# Patient Record
Sex: MALE | Race: WHITE | NOT HISPANIC OR LATINO | Employment: OTHER | URBAN - METROPOLITAN AREA
[De-identification: names, ages, dates, MRNs, and addresses within clinical notes are randomized per-mention and may not be internally consistent; named-entity substitution may affect disease eponyms.]

---

## 2019-06-19 ENCOUNTER — TRANSCRIBE ORDERS (OUTPATIENT)
Dept: ADMINISTRATIVE | Facility: HOSPITAL | Age: 44
End: 2019-06-19

## 2019-06-19 DIAGNOSIS — R59.0 INGUINAL LYMPHADENOPATHY: Primary | ICD-10-CM

## 2019-06-20 ENCOUNTER — HOSPITAL ENCOUNTER (OUTPATIENT)
Dept: GENERAL RADIOLOGY | Facility: HOSPITAL | Age: 44
Discharge: HOME | End: 2019-06-20
Admitting: INTERNAL MEDICINE

## 2019-06-20 ENCOUNTER — TRANSCRIBE ORDERS (OUTPATIENT)
Dept: ADMINISTRATIVE | Facility: HOSPITAL | Age: 44
End: 2019-06-20

## 2019-06-20 DIAGNOSIS — R61 GENERALIZED HYPERHIDROSIS: Primary | ICD-10-CM

## 2019-06-20 DIAGNOSIS — R59.0 INGUINAL ADENOPATHY: ICD-10-CM

## 2019-06-20 DIAGNOSIS — R61 GENERALIZED HYPERHIDROSIS: ICD-10-CM

## 2019-06-20 PROCEDURE — 71046 X-RAY EXAM CHEST 2 VIEWS: CPT

## 2019-06-27 ENCOUNTER — HOSPITAL ENCOUNTER (OUTPATIENT)
Dept: ULTRASOUND IMAGING | Facility: HOSPITAL | Age: 44
Discharge: HOME | End: 2019-06-27
Admitting: INTERNAL MEDICINE

## 2019-06-27 DIAGNOSIS — R59.0 INGUINAL LYMPHADENOPATHY: ICD-10-CM

## 2019-06-27 PROCEDURE — 76882 US LMTD JT/FCL EVL NVASC XTR: CPT

## 2020-02-25 ENCOUNTER — TRANSCRIBE ORDERS (OUTPATIENT)
Dept: ADMINISTRATIVE | Facility: HOSPITAL | Age: 45
End: 2020-02-25

## 2020-02-25 DIAGNOSIS — H53.2 DIPLOPIA: Primary | ICD-10-CM

## 2020-03-04 ENCOUNTER — HOSPITAL ENCOUNTER (OUTPATIENT)
Dept: MRI IMAGING | Facility: HOSPITAL | Age: 45
Discharge: HOME | End: 2020-03-04
Admitting: INTERNAL MEDICINE

## 2020-03-04 ENCOUNTER — APPOINTMENT (OUTPATIENT)
Dept: MRI IMAGING | Facility: HOSPITAL | Age: 45
End: 2020-03-04

## 2020-03-04 DIAGNOSIS — H53.2 DIPLOPIA: ICD-10-CM

## 2020-03-04 PROCEDURE — 70553 MRI BRAIN STEM W/O & W/DYE: CPT

## 2020-03-04 PROCEDURE — 0 GADOBENATE DIMEGLUMINE 529 MG/ML SOLUTION: Performed by: INTERNAL MEDICINE

## 2020-03-04 PROCEDURE — A9577 INJ MULTIHANCE: HCPCS | Performed by: INTERNAL MEDICINE

## 2020-03-04 RX ADMIN — GADOBENATE DIMEGLUMINE 12 ML: 529 INJECTION, SOLUTION INTRAVENOUS at 09:14

## 2020-03-12 ENCOUNTER — TRANSCRIBE ORDERS (OUTPATIENT)
Dept: ADMINISTRATIVE | Facility: HOSPITAL | Age: 45
End: 2020-03-12

## 2020-03-12 ENCOUNTER — HOSPITAL ENCOUNTER (OUTPATIENT)
Dept: GENERAL RADIOLOGY | Facility: HOSPITAL | Age: 45
Discharge: HOME | End: 2020-03-12
Admitting: INTERNAL MEDICINE

## 2020-03-12 DIAGNOSIS — R06.09 DYSPNEA ON EXERTION: Primary | ICD-10-CM

## 2020-03-12 DIAGNOSIS — R06.09 DYSPNEA ON EXERTION: ICD-10-CM

## 2020-03-12 PROCEDURE — 71046 X-RAY EXAM CHEST 2 VIEWS: CPT

## 2020-03-19 ENCOUNTER — TRANSCRIBE ORDERS (OUTPATIENT)
Dept: ADMINISTRATIVE | Facility: HOSPITAL | Age: 45
End: 2020-03-19

## 2020-03-19 DIAGNOSIS — R59.1 LYMPHADENOPATHY: ICD-10-CM

## 2020-03-19 DIAGNOSIS — R05.9 COUGH: Primary | ICD-10-CM

## 2020-04-01 ENCOUNTER — LAB (OUTPATIENT)
Dept: LAB | Facility: HOSPITAL | Age: 45
End: 2020-04-01

## 2020-04-01 ENCOUNTER — TRANSCRIBE ORDERS (OUTPATIENT)
Dept: ADMINISTRATIVE | Facility: HOSPITAL | Age: 45
End: 2020-04-01

## 2020-04-01 ENCOUNTER — HOSPITAL ENCOUNTER (OUTPATIENT)
Dept: CT IMAGING | Facility: HOSPITAL | Age: 45
Discharge: HOME | End: 2020-04-01
Admitting: PSYCHIATRY & NEUROLOGY

## 2020-04-01 DIAGNOSIS — R05.9 COUGH: ICD-10-CM

## 2020-04-01 DIAGNOSIS — R90.89 ABNORMAL COMPUTERIZED TOMOGRAPHY OF BRAIN: ICD-10-CM

## 2020-04-01 DIAGNOSIS — R59.1 LYMPHADENOPATHY: ICD-10-CM

## 2020-04-01 DIAGNOSIS — H53.2 DIPLOPIA: ICD-10-CM

## 2020-04-01 DIAGNOSIS — H53.2 DIPLOPIA: Primary | ICD-10-CM

## 2020-04-01 LAB
25(OH)D3 SERPL-MCNC: 21.5 NG/ML (ref 30–100)
FOLATE SERPL-MCNC: 10.4 NG/ML (ref 4.78–24.2)
RPR SER QL: NORMAL
T4 FREE SERPL-MCNC: 1.23 NG/DL (ref 0.93–1.7)
TSH SERPL DL<=0.05 MIU/L-ACNC: 1.83 UIU/ML (ref 0.27–4.2)
VIT B12 BLD-MCNC: 679 PG/ML (ref 211–946)

## 2020-04-01 PROCEDURE — 84439 ASSAY OF FREE THYROXINE: CPT

## 2020-04-01 PROCEDURE — 86592 SYPHILIS TEST NON-TREP QUAL: CPT

## 2020-04-01 PROCEDURE — 82746 ASSAY OF FOLIC ACID SERUM: CPT

## 2020-04-01 PROCEDURE — 86618 LYME DISEASE ANTIBODY: CPT

## 2020-04-01 PROCEDURE — 84443 ASSAY THYROID STIM HORMONE: CPT

## 2020-04-01 PROCEDURE — 86617 LYME DISEASE ANTIBODY: CPT

## 2020-04-01 PROCEDURE — 86038 ANTINUCLEAR ANTIBODIES: CPT

## 2020-04-01 PROCEDURE — 71260 CT THORAX DX C+: CPT

## 2020-04-01 PROCEDURE — 86376 MICROSOMAL ANTIBODY EACH: CPT

## 2020-04-01 PROCEDURE — 0 IOPAMIDOL PER 1 ML: Performed by: PSYCHIATRY & NEUROLOGY

## 2020-04-01 PROCEDURE — 82306 VITAMIN D 25 HYDROXY: CPT

## 2020-04-01 PROCEDURE — 84207 ASSAY OF VITAMIN B-6: CPT

## 2020-04-01 PROCEDURE — 36415 COLL VENOUS BLD VENIPUNCTURE: CPT

## 2020-04-01 PROCEDURE — 82607 VITAMIN B-12: CPT

## 2020-04-01 PROCEDURE — 86800 THYROGLOBULIN ANTIBODY: CPT

## 2020-04-01 RX ADMIN — IOPAMIDOL 100 ML: 755 INJECTION, SOLUTION INTRAVENOUS at 13:01

## 2020-04-02 LAB
ANA SER QL: NEGATIVE
THYROGLOB AB SERPL-ACNC: <1 IU/ML (ref 0–0.9)
THYROPEROXIDASE AB SERPL-ACNC: <9 IU/ML (ref 0–34)

## 2020-04-03 LAB
B BURGDOR IGG PATRN SER IB-IMP: NEGATIVE
B BURGDOR IGG+IGM SER-ACNC: 0.91 ISR (ref 0–0.9)
B BURGDOR IGM PATRN SER IB-IMP: NEGATIVE
B BURGDOR18KD IGG SER QL IB: ABNORMAL
B BURGDOR23KD IGG SER QL IB: ABNORMAL
B BURGDOR23KD IGM SER QL IB: PRESENT
B BURGDOR28KD IGG SER QL IB: ABNORMAL
B BURGDOR30KD IGG SER QL IB: ABNORMAL
B BURGDOR39KD IGG SER QL IB: PRESENT
B BURGDOR39KD IGM SER QL IB: ABNORMAL
B BURGDOR41KD IGG SER QL IB: ABNORMAL
B BURGDOR41KD IGM SER QL IB: ABNORMAL
B BURGDOR45KD IGG SER QL IB: ABNORMAL
B BURGDOR58KD IGG SER QL IB: PRESENT
B BURGDOR66KD IGG SER QL IB: ABNORMAL
B BURGDOR93KD IGG SER QL IB: PRESENT
VIT B6 SERPL-MCNC: 31.2 UG/L (ref 5.3–46.7)

## 2023-02-15 ENCOUNTER — TRANSCRIBE ORDERS (OUTPATIENT)
Dept: ADMINISTRATIVE | Facility: HOSPITAL | Age: 48
End: 2023-02-15
Payer: COMMERCIAL

## 2023-02-15 DIAGNOSIS — R29.90 UNSPECIFIED SYMPTOMS AND SIGNS INVOLVING THE NERVOUS SYSTEM: ICD-10-CM

## 2023-02-15 DIAGNOSIS — R10.9 ABDOMINAL PAIN, UNSPECIFIED ABDOMINAL LOCATION: Primary | ICD-10-CM

## 2023-02-21 ENCOUNTER — OFFICE VISIT (OUTPATIENT)
Dept: NEUROLOGY | Facility: CLINIC | Age: 48
End: 2023-02-21
Payer: COMMERCIAL

## 2023-02-21 VITALS
OXYGEN SATURATION: 96 % | DIASTOLIC BLOOD PRESSURE: 78 MMHG | HEIGHT: 70 IN | BODY MASS INDEX: 19.9 KG/M2 | HEART RATE: 69 BPM | SYSTOLIC BLOOD PRESSURE: 104 MMHG | WEIGHT: 139 LBS

## 2023-02-21 DIAGNOSIS — G37.9 DEMYELINATING CHANGES IN BRAIN: Primary | ICD-10-CM

## 2023-02-21 PROCEDURE — 99205 OFFICE O/P NEW HI 60 MIN: CPT | Performed by: PSYCHIATRY & NEUROLOGY

## 2023-02-21 RX ORDER — DIAZEPAM 5 MG/1
5 TABLET ORAL 2 TIMES DAILY PRN
Qty: 2 TABLET | Refills: 0 | Status: SHIPPED | OUTPATIENT
Start: 2023-02-21 | End: 2023-03-21

## 2023-02-21 RX ORDER — ALBUTEROL SULFATE 90 UG/1
AEROSOL, METERED RESPIRATORY (INHALATION)
COMMUNITY

## 2023-02-21 NOTE — PROGRESS NOTES
Notes by MA:  Patient has been referred to our office for neurological deficits. Pt c/o double/tunnel vision and dropping things. Pt sts he was told before by [facility] he has MS.          Subjective:     Dear Dr. Boateng, thank you for referring Mr. Castro for neurological consultation.  As you know, he is a 47-year-old gentleman sent for evaluation and management of possible demyelinating disease based on previous MRI scans.  Last MRI was performed at [facility] because of double vision and revealed 2 small stable areas of increased T2 signal in the right frontal and left mid periventricular white matter.  There was no new white matter lesions as compared to his previous study from 6 months prior.  There was no pathologic contrast-enhancement.  His diplopia has been intermittent since that time.  He is also complaining of gradually worsening fatigue over time despite how much she sleeps.  Sleeping has been an issue for him however.  He has begun to drop things.  He has decreased concentration and feels depressed.  He feels like he has balance problems and occasional jerking or spasms of the arms and legs.  He also describes numbness tingling and burning sensation sometimes extending through the shoulders and all the way down the lower extremities.  These come and go and appear to be position dependent.  No clear Lhermitte phenomenon however.  Patient ID: Cyrus Castro is a 47 y.o. male.    History of Present Illness  The following portions of the patient's history were reviewed and updated as appropriate: allergies, current medications, past family history, past medical history, past social history, past surgical history and problem list.    Review of Systems   Constitutional: Positive for fatigue. Negative for activity change and appetite change.   HENT: Negative for facial swelling and trouble swallowing.    Eyes: Positive for visual disturbance (double/tunnel vision). Negative for  photophobia and pain.   Respiratory: Negative for chest tightness, shortness of breath and wheezing.    Cardiovascular: Negative for chest pain, palpitations and leg swelling.   Gastrointestinal: Positive for abdominal pain. Negative for nausea and vomiting.   Musculoskeletal: Positive for arthralgias, back pain, gait problem, myalgias, neck pain and neck stiffness. Negative for joint swelling.   Neurological: Positive for weakness (general, BUE, BLE) and light-headedness (off balance). Negative for dizziness, tremors, seizures, syncope, facial asymmetry, speech difficulty, numbness and headaches.   Hematological: Does not bruise/bleed easily.   Psychiatric/Behavioral: Positive for agitation, decreased concentration, dysphoric mood and sleep disturbance (falling and staying asleep). Negative for behavioral problems, confusion, hallucinations, self-injury and suicidal ideas. The patient is nervous/anxious. The patient is not hyperactive.         Objective:    Neurologic Exam  Awake and alert but anxious.  Speech is fluent.  Speech comprehension is reasonable.  Reasonable fund of knowledge and normal social demeanor.    Cranial nerves II through XII normal and symmetric.      Motor exam reveals reasonable power bilaterally throughout.  I think there is subtly increased tone in the left arm as compared to the right.    Sensory exam reveals intact sensation to light touch temperature and vibration bilaterally.    Coordination testing reveals very mild dysmetria with finger-nose-finger and heel-to-shin on the left.  Normal on the right.  Romberg testing is negative.  Walks on a narrow base with good stride length and a normal arm swing.    Tendon reflexes 2+ and symmetric throughout including ankle jerks bilaterally.  Plantar signs are equivocal bilaterally.  Physical Exam  Smells of cigarette smoke.  Neck is supple.  No cervical bruits.  Cardiac rhythm is regular.  Abdomen is thin and soft.  Extremities are without  edema.  Assessment/Plan:     Diagnoses and all orders for this visit:    1. Demyelinating changes in brain (HCC) (Primary)  -     MRI Brain With & Without Contrast; Future  -     MRI Cervical Spine With & Without Contrast; Future  -     diazePAM (VALIUM) 5 MG tablet; Take 1 tablet by mouth 2 (Two) Times a Day As Needed for Anxiety (Take 1 pill 30 minutes before MRI.  Repeat at the time of MRI if necessary.).  Dispense: 2 tablet; Refill: 0     Mr. Castro is a 47-year-old gentleman with a history of intermittent diplopia as well as a variety of other complaints as outlined in detail above, who was initially evaluated at [facility] with an MRI of the brain.  This study revealed several lesions suspicious for demyelinating disease and was evaluated by Dr. Reed.  CSF analysis did reveal a mildly elevated protein but no oligoclonal bands or elevated IgG index.  CSF Lyme antibody was negative.  This left the differential somewhat open including demyelinating, autoimmune, and other inflammatory conditions of the brain.  Serial follow-up MRIs were recommended but he was lost to follow-up.  I am reinitiating work-up with MRI of the brain and cervical spine with and without contrast.  I will review the results of these when available and take any further measures that may be necessary at that point.  We will schedule follow-up here shortly after his studies.  I discussed all the above in detail with the patient and answered his questions to the best of my ability.  Thank you so much for the opportunity to participate in his care.    60 minutes patient care time today.

## 2023-03-09 ENCOUNTER — APPOINTMENT (OUTPATIENT)
Dept: MRI IMAGING | Facility: HOSPITAL | Age: 48
End: 2023-03-09
Payer: COMMERCIAL

## 2023-03-09 ENCOUNTER — HOSPITAL ENCOUNTER (OUTPATIENT)
Dept: CT IMAGING | Facility: HOSPITAL | Age: 48
Discharge: HOME | End: 2023-03-09
Admitting: INTERNAL MEDICINE
Payer: COMMERCIAL

## 2023-03-09 DIAGNOSIS — R10.9 ABDOMINAL PAIN, UNSPECIFIED ABDOMINAL LOCATION: ICD-10-CM

## 2023-03-09 PROCEDURE — 74177 CT ABD & PELVIS W/CONTRAST: CPT

## 2023-03-09 PROCEDURE — 25510000001 IOPAMIDOL 61 % SOLUTION: Performed by: INTERNAL MEDICINE

## 2023-03-09 RX ADMIN — IOPAMIDOL 100 ML: 612 INJECTION, SOLUTION INTRAVENOUS at 15:01

## 2023-03-10 ENCOUNTER — HOSPITAL ENCOUNTER (OUTPATIENT)
Dept: MRI IMAGING | Facility: HOSPITAL | Age: 48
Discharge: HOME | End: 2023-03-10
Payer: COMMERCIAL

## 2023-03-10 DIAGNOSIS — G37.9 DEMYELINATING CHANGES IN BRAIN: ICD-10-CM

## 2023-03-10 PROCEDURE — 70553 MRI BRAIN STEM W/O & W/DYE: CPT

## 2023-03-10 PROCEDURE — A9577 INJ MULTIHANCE: HCPCS | Performed by: PSYCHIATRY & NEUROLOGY

## 2023-03-10 PROCEDURE — 0 GADOBENATE DIMEGLUMINE 529 MG/ML SOLUTION: Performed by: PSYCHIATRY & NEUROLOGY

## 2023-03-10 RX ADMIN — GADOBENATE DIMEGLUMINE 13 ML: 529 INJECTION, SOLUTION INTRAVENOUS at 11:16

## 2023-03-21 ENCOUNTER — OFFICE VISIT (OUTPATIENT)
Dept: NEUROLOGY | Facility: CLINIC | Age: 48
End: 2023-03-21
Payer: COMMERCIAL

## 2023-03-21 VITALS
BODY MASS INDEX: 19.9 KG/M2 | HEART RATE: 67 BPM | SYSTOLIC BLOOD PRESSURE: 112 MMHG | OXYGEN SATURATION: 97 % | WEIGHT: 139 LBS | DIASTOLIC BLOOD PRESSURE: 80 MMHG | HEIGHT: 70 IN

## 2023-03-21 DIAGNOSIS — G37.9 DEMYELINATING CHANGES IN BRAIN: Primary | ICD-10-CM

## 2023-03-21 PROCEDURE — 1160F RVW MEDS BY RX/DR IN RCRD: CPT | Performed by: PSYCHIATRY & NEUROLOGY

## 2023-03-21 PROCEDURE — 1159F MED LIST DOCD IN RCRD: CPT | Performed by: PSYCHIATRY & NEUROLOGY

## 2023-03-21 PROCEDURE — 99214 OFFICE O/P EST MOD 30 MIN: CPT | Performed by: PSYCHIATRY & NEUROLOGY

## 2023-03-21 RX ORDER — FLUOXETINE HYDROCHLORIDE 20 MG/1
1 CAPSULE ORAL DAILY
COMMUNITY
Start: 2023-03-01

## 2023-03-21 RX ORDER — TRAMADOL HYDROCHLORIDE 50 MG/1
TABLET ORAL EVERY 6 HOURS
COMMUNITY

## 2023-03-21 RX ORDER — HYOSCYAMINE SULFATE 0.125 MG
1 TABLET ORAL
COMMUNITY
Start: 2023-03-01

## 2023-03-21 RX ORDER — DIAZEPAM 5 MG/1
5 TABLET ORAL 2 TIMES DAILY PRN
Qty: 2 TABLET | Refills: 0 | Status: SHIPPED | OUTPATIENT
Start: 2023-03-21

## 2023-03-21 NOTE — PROGRESS NOTES
Notes by MA:  Patient presents today for symptoms of demyelinating disease. Patient reports no new or worsening symptoms. Patient had MRI Brain, but was unable to complete MRI C-Spine due to pain from laying  in the machine too long.        Subjective:     Patient ID: Cyrus Castro is a 47 y.o. male.    History of Present Illness  The following portions of the patient's history were reviewed and updated as appropriate: allergies, current medications, past family history, past medical history, past social history, past surgical history and problem list.    Review of Systems   Constitutional: Positive for fatigue.   Eyes: Negative for visual disturbance.   Musculoskeletal: Positive for arthralgias, gait problem and myalgias.   Neurological: Positive for tremors and weakness (general). Negative for dizziness, seizures, syncope, facial asymmetry, speech difficulty, light-headedness, numbness and headaches.   Psychiatric/Behavioral: Positive for sleep disturbance. Negative for agitation, behavioral problems, confusion, decreased concentration, dysphoric mood, hallucinations, self-injury and suicidal ideas. The patient is not nervous/anxious and is not hyperactive.         Objective:    Neurologic Exam  Awake and alert but anxious.  Speech is fluent.  Speech comprehension is reasonable.  Reasonable fund of knowledge and normal social demeanor.     Cranial nerves II through XII normal and symmetric.       Motor exam reveals reasonable power bilaterally throughout.  I think there is subtly increased tone in the left arm as compared to the right.     Sensory exam reveals intact sensation to light touch temperature and vibration bilaterally.     Coordination testing reveals very mild dysmetria with finger-nose-finger and heel-to-shin on the left.  Normal on the right.  Romberg testing is negative.  Walks on a narrow base with good stride length and a normal arm swing.     Tendon reflexes 2+ and symmetric throughout  including ankle jerks bilaterally.  Plantar signs are equivocal bilaterally.  Physical Exam    Assessment/Plan:     Diagnoses and all orders for this visit:    1. Demyelinating changes in brain (HCC) (Primary)  -     MRI Cervical Spine With & Without Contrast; Future  -     diazePAM (VALIUM) 5 MG tablet; Take 1 tablet by mouth 2 (Two) Times a Day As Needed for Anxiety. Take one 20 min prior to MRI, repeat at time of MRI if needed  Dispense: 2 tablet; Refill: 0     He completed his brain MRI with and without contrast.  This study did reveal enlargement of some of the white matter lesions but no clear contrast-enhancement to suggest acute demyelinating attack.  He could not tolerate his C-spine MRI and we will arrange that for him once again.  Again, he will need sedation.  I will review the results and will make appropriate decisions on whether we need to proceed with another lumbar puncture or not.  I discussed all this with him and answered his extensive questions today.

## 2023-04-03 ENCOUNTER — TRANSCRIBE ORDERS (OUTPATIENT)
Dept: CT IMAGING | Facility: HOSPITAL | Age: 48
End: 2023-04-03
Payer: COMMERCIAL

## 2023-04-03 ENCOUNTER — TRANSCRIBE ORDERS (OUTPATIENT)
Dept: MRI IMAGING | Facility: HOSPITAL | Age: 48
End: 2023-04-03
Payer: COMMERCIAL

## 2023-04-03 DIAGNOSIS — M54.50 LOW BACK PAIN, UNSPECIFIED BACK PAIN LATERALITY, UNSPECIFIED CHRONICITY, UNSPECIFIED WHETHER SCIATICA PRESENT: Primary | ICD-10-CM

## 2023-04-03 DIAGNOSIS — R91.1 SOLITARY PULMONARY NODULE: Primary | ICD-10-CM

## 2023-04-21 ENCOUNTER — HOSPITAL ENCOUNTER (OUTPATIENT)
Dept: CT IMAGING | Facility: HOSPITAL | Age: 48
Discharge: HOME | End: 2023-04-21
Payer: COMMERCIAL

## 2023-04-21 ENCOUNTER — HOSPITAL ENCOUNTER (OUTPATIENT)
Dept: MRI IMAGING | Facility: HOSPITAL | Age: 48
End: 2023-04-21
Payer: COMMERCIAL

## 2023-04-21 DIAGNOSIS — R91.1 SOLITARY PULMONARY NODULE: ICD-10-CM

## 2023-04-21 PROCEDURE — 71250 CT THORAX DX C-: CPT

## 2023-05-01 ENCOUNTER — TRANSCRIBE ORDERS (OUTPATIENT)
Dept: ADMINISTRATIVE | Facility: HOSPITAL | Age: 48
End: 2023-05-01
Payer: COMMERCIAL

## 2023-05-01 DIAGNOSIS — R91.8 OTHER NONSPECIFIC ABNORMAL FINDING OF LUNG FIELD: Primary | ICD-10-CM

## 2023-05-12 ENCOUNTER — HOSPITAL ENCOUNTER (OUTPATIENT)
Dept: PET IMAGING | Facility: HOSPITAL | Age: 48
Discharge: HOME | End: 2023-05-12
Payer: COMMERCIAL

## 2023-05-12 DIAGNOSIS — R91.8 OTHER NONSPECIFIC ABNORMAL FINDING OF LUNG FIELD: ICD-10-CM

## 2023-05-12 LAB — GLUCOSE BLDC GLUCOMTR-MCNC: 81 MG/DL (ref 70–130)

## 2023-05-12 PROCEDURE — A9552 F18 FDG: HCPCS | Performed by: INTERNAL MEDICINE

## 2023-05-12 PROCEDURE — 0 FLUDEOXYGLUCOSE F18 SOLUTION: Performed by: INTERNAL MEDICINE

## 2023-05-12 PROCEDURE — 82948 REAGENT STRIP/BLOOD GLUCOSE: CPT

## 2023-05-12 PROCEDURE — 78815 PET IMAGE W/CT SKULL-THIGH: CPT

## 2023-05-12 RX ADMIN — FLUDEOXYGLUCOSE F18 1 DOSE: 300 INJECTION INTRAVENOUS at 12:04

## 2023-05-17 ENCOUNTER — OFFICE VISIT (OUTPATIENT)
Dept: GASTROENTEROLOGY | Facility: CLINIC | Age: 48
End: 2023-05-17
Payer: COMMERCIAL

## 2023-05-17 VITALS
DIASTOLIC BLOOD PRESSURE: 70 MMHG | HEIGHT: 70 IN | BODY MASS INDEX: 19.39 KG/M2 | SYSTOLIC BLOOD PRESSURE: 102 MMHG | WEIGHT: 135.4 LBS

## 2023-05-17 DIAGNOSIS — K59.03 DRUG-INDUCED CONSTIPATION: Primary | ICD-10-CM

## 2023-05-17 PROCEDURE — 99204 OFFICE O/P NEW MOD 45 MIN: CPT | Performed by: INTERNAL MEDICINE

## 2023-05-17 PROCEDURE — 1159F MED LIST DOCD IN RCRD: CPT | Performed by: INTERNAL MEDICINE

## 2023-05-17 PROCEDURE — 1160F RVW MEDS BY RX/DR IN RCRD: CPT | Performed by: INTERNAL MEDICINE

## 2023-05-17 RX ORDER — OMEPRAZOLE 40 MG/1
40 CAPSULE, DELAYED RELEASE ORAL DAILY
Qty: 90 CAPSULE | Refills: 3 | Status: SHIPPED | OUTPATIENT
Start: 2023-05-17

## 2023-05-17 RX ORDER — HYOSCYAMINE SULFATE EXTENDED-RELEASE 0.38 MG/1
0.38 TABLET ORAL 2 TIMES DAILY
Qty: 60 TABLET | Refills: 12 | Status: SHIPPED | OUTPATIENT
Start: 2023-05-17

## 2023-05-17 NOTE — PROGRESS NOTES
"    PATIENT INFORMATION  Cyrus Castro       - 1975    CHIEF COMPLAINT  Chief Complaint   Patient presents with   • Abdominal Pain   • Diarrhea       HISTORY OF PRESENT ILLNESS  6 Months or a year of \" stomach issues\"  Nocturia followed by a BM due to cramps and then some days will go 2-3 time in the Am and still incomplete emptying and sometimes cant even though he feels like he as to..     Feels his history is post coffee he would go and then be done. Doesn't feel he skips days now but isnt emptying.    Has been dealing with MS dioagnosis for the last 3 years has seen Neurology at [facility] and [facility]    Denies sulfur taste/ Egg burps but does have foul gas and not really bloating more cramping last Abx were > a year ago      REVIEWED PERTINENT RESULTS/ LABS  No results found for: CASEREPORT, FINALDX  No results found for: HGB, MCV, PLT, ALT, AST, HGBA1C, GFR, INR, TRIG, FERRITIN, IRON, TIBC   CT Chest Without Contrast Diagnostic    Result Date: 2023  Narrative: CT CHEST, NONCONTRAST, 2023 HISTORY: 48-year-old male, longtime smoker, with a previous study three years ago showing an irregular nodular opacity at the right lung apex for which 6-12 month CT follow-up was recommended. No interval CT studies have been performed here. He returns today for follow-up. TECHNIQUE: CT imaging of the chest without IV contrast. Radiation dose reduction techniques included automated exposure control. Radiation audit for CT and nuclear cardiology exams in the last 12 months: 1. COMPARISON: *  CT chest, 2020. FINDINGS: Previous irregular 7 mm nodule in the medial right lung apex has enlarged, now measuring 1.0 x 0.7 cm (series #3/image 32). New nonsolid nodule in the posterior right upper lobe measuring 1.3 x 0.7 cm (3/47). Increasing patchy multifocal groundglass opacities scattered elsewhere within both upper lobes, nonspecific but most likely inflammatory. Moderately severe centrilobular and paraseptal " pulmonary emphysema, greatest at the lung apices. Diffuse multifocal air trapping. Central bronchial wall thickening. No suspicious mass or adenopathy is seen within the mediastinum or pulmonary melody. Heart and great vessels unremarkable.     Impression: 1.  Irregular nodule in the medial right lung apex as increased in size since 2020, now measuring 1.0 cm. 2.  New 1.3 cm nonsolid right upper lobe nodule. 3.  Hazy airspace nodularity scattered elsewhere within the upper lungs, nonspecific but most likely inflammatory. 4.  Follow-up PET/CT imaging is recommended for further assessment of the new and enlarging pulmonary nodular lesions. 5.  Pulmonary emphysema. Signer Name: Jamison Alexander MD  Signed: 4/23/2023 10:41 PM  Workstation Name: [email or web address]  [facility] PET/CT Skull Base to Mid Thigh    Result Date: 5/16/2023  Narrative: F-18 FDG PET SKULL BASE TO MID THIGH WITH PET CT FUSION.  HISTORY: 48-year-old male with pulmonary nodules.  TECHNIQUE: Radiation dose reduction techniques were utilized, including automated exposure control and exposure modulation based on body size. Blood glucose level at time of injection was 81 mg/dL. 5.6 mCi of F-18 FDG were injected and PET was performed from skull base to mid thigh. CT was obtained for localization and attenuation correction. Time at injection 11:55 AM. PET start time 1:26 PM. Compared with chest CT 04/21/2023.  FINDINGS: The lungs are not in full inspiration as they were on the recent chest CT resulting in more dense regions of atelectatic lung, such as at the dependent aspect of both lower lobes, and there is a mosaic pattern of density as well suggesting multiple regions of air trapping. The nodular opacities at the upper lobes are either photopenic or have less intense activity than that of blood pool. The blood pool has a maximal SUV of 2.6 and the most intense activity at the upper lobe nodular opacities is 1.6. The 1.4 cm  nodular opacity at the posterior segment of the right upper lobe along the major fissure has a maximal SUV of 1.6. The lungs with dependent atelectatic change have a maximal SUV of 2.0. There is no hypermetabolic lymphadenopathy. Stable shotty mediastinal nodes have blood pool range activity. There is no suspicious hypermetabolic activity at the supraclavicular regions or neck. There is no suspicious hypermetabolic activity within the abdomen or pelvis. There is nonspecific low-level bowel activity. There is no suspicious bone activity.      Impression: 1. The upper lobe pulmonary nodules are photopenic. Referral to the [facility] for additional evaluation is recommended. 2. The mosaic pattern of density within both lungs suggests air trapping. Atypical or viral pneumonia in the regions of relative ground glass density is also possible, but is not considered to be likely.  This report was finalized on 5/16/2023 3:00 PM by Dr. Renée Olvera M.D.        REVIEW OF SYSTEMS  Review of Systems   Constitutional: Negative for activity change, chills, fever and unexpected weight change.   HENT: Negative for congestion.    Eyes: Negative for visual disturbance.   Respiratory: Negative for shortness of breath.    Cardiovascular: Negative for chest pain and palpitations.   Gastrointestinal: Positive for abdominal distention, abdominal pain, diarrhea and nausea. Negative for blood in stool.   Endocrine: Negative for cold intolerance and heat intolerance.   Genitourinary: Negative for hematuria.   Musculoskeletal: Negative for gait problem.   Skin: Negative for color change.   Allergic/Immunologic: Negative for immunocompromised state.   Neurological: Negative for weakness and light-headedness.   Hematological: Negative for adenopathy.   Psychiatric/Behavioral: Negative for sleep disturbance. The patient is not nervous/anxious.          ACTIVE PROBLEMS  Patient Active Problem List    Diagnosis    •  Demyelinating changes in brain [G37.9]          PAST MEDICAL HISTORY  Past Medical History:   Diagnosis Date   • Snowboard accident 02/2013         SURGICAL HISTORY  Past Surgical History:   Procedure Laterality Date   • CLAVICLE OPEN REDUCTION INTERNAL FIXATION     • HEMORRHOIDECTOMY     • ORIF FOREARM FRACTURE           FAMILY HISTORY  Family History   Problem Relation Age of Onset   • Multiple sclerosis Maternal Aunt    • Breast cancer Other          SOCIAL HISTORY  Social History     Occupational History   • Not on file   Tobacco Use   • Smoking status: Every Day     Packs/day: 1.00     Years: 20.00     Pack years: 20.00     Types: Cigarettes     Passive exposure: Never   • Smokeless tobacco: Never   • Tobacco comments:     Counseling per PMD   Vaping Use   • Vaping Use: Never used   Substance and Sexual Activity   • Alcohol use: Not Currently   • Drug use: Not Currently   • Sexual activity: Defer         CURRENT MEDICATIONS    Current Outpatient Medications:   •  acetaminophen (TYLENOL) 325 MG tablet, Take 3 tablets by mouth 4 (Four) Times a Day., Disp: , Rfl:   •  albuterol sulfate  (90 Base) MCG/ACT inhaler, albuterol sulfate HFA 90 mcg/actuation aerosol inhaler, Disp: , Rfl:   •  diazePAM (VALIUM) 5 MG tablet, Take 1 tablet by mouth 2 (Two) Times a Day As Needed for Anxiety. Take one 20 min prior to MRI, repeat at time of MRI if needed, Disp: 2 tablet, Rfl: 0  •  FLUoxetine (PROzac) 20 MG capsule, Take 1 capsule by mouth Daily. (Patient not taking: Reported on 3/21/2023), Disp: , Rfl:   •  gabapentin (NEURONTIN) 300 MG capsule, Take 2 capsules by mouth 3 (three) times a day. (Patient not taking: Reported on 3/21/2023), Disp: , Rfl:   •  HYDROcodone-acetaminophen (NORCO) 7.5-325 MG per tablet, Take  by mouth 3 (three) times a day. (Patient not taking: Reported on 3/21/2023), Disp: , Rfl:   •  hyoscyamine (ANASPAZ,LEVSIN) 0.125 MG tablet, Take 1 tablet by mouth 6 (Six) Times a Day., Disp: , Rfl:   •   "hyoscyamine (LEVBID) 0.375 MG 12 hr tablet, Take 1 tablet by mouth 2 (Two) Times a Day., Disp: 60 tablet, Rfl: 12  •  Ibuprofen 200 MG capsule, Take 2 capsules by mouth 4 (four) times a day. (Patient not taking: Reported on 3/21/2023), Disp: , Rfl:   •  traMADol (ULTRAM) 50 MG tablet, Every 6 (Six) Hours., Disp: , Rfl:     ALLERGIES  Patient has no known allergies.    VITALS  Vitals:    05/17/23 0949   BP: 102/70   BP Location: Left arm   Patient Position: Sitting   Cuff Size: Adult   Weight: 61.4 kg (135 lb 6.4 oz)   Height: 177.8 cm (70\")       PHYSICAL EXAM  Debilities/Disabilities Identified: None  Emotional Behavior: Appropriate  Wt Readings from Last 3 Encounters:   05/17/23 61.4 kg (135 lb 6.4 oz)   04/07/23 63.5 kg (140 lb)   03/21/23 63 kg (139 lb)     Ht Readings from Last 1 Encounters:   05/17/23 177.8 cm (70\")     Body mass index is 19.43 kg/m².  Physical Exam  Constitutional:       Appearance: He is well-developed.   HENT:      Head: Normocephalic and atraumatic.   Eyes:      General: No scleral icterus.     Pupils: Pupils are equal, round, and reactive to light.   Neck:      Thyroid: No thyromegaly.   Cardiovascular:      Rate and Rhythm: Normal rate and regular rhythm.      Heart sounds: Normal heart sounds. No murmur heard.    No gallop.   Pulmonary:      Effort: Pulmonary effort is normal.      Breath sounds: Normal breath sounds. No wheezing or rales.   Abdominal:      General: Bowel sounds are normal. There is no distension or abdominal bruit.      Palpations: Abdomen is soft. Abdomen is not rigid. There is no shifting dullness, fluid wave, hepatomegaly, splenomegaly, mass or pulsatile mass.      Tenderness: There is no abdominal tenderness. There is no guarding or rebound. Negative signs include Bobby's sign.      Hernia: No hernia is present. There is no hernia in the ventral area.   Musculoskeletal:         General: Normal range of motion.      Cervical back: Normal range of motion and neck " supple.   Lymphadenopathy:      Cervical: No cervical adenopathy.   Skin:     General: Skin is warm and dry.      Findings: No erythema or rash.   Neurological:      Mental Status: He is alert and oriented to person, place, and time.         CLINICAL DATA REVIEWED   reviewed previous lab results and integrated with today's visit, reviewed notes from other physicians and/or last GI encounter, reviewed previous endoscopy results and available photos, reviewed surgical pathology results from previous biopsies    ASSESSMENT  Diagnoses and all orders for this visit:    Drug-induced constipation    Other orders  -     hyoscyamine (LEVBID) 0.375 MG 12 hr tablet; Take 1 tablet by mouth 2 (Two) Times a Day.          PLAN  Supportive care with Fiber and probiotic prior to adding Miralax , sent LEVBID a well    Return in about 3 months (around 8/17/2023).    I have discussed the above plan with the patient.  They verbalize understanding and are in agreement with the plan.  They have been advised to contact the office for any questions, concerns, or changes related to their health.

## 2023-05-18 ENCOUNTER — PATIENT ROUNDING (BHMG ONLY) (OUTPATIENT)
Dept: GASTROENTEROLOGY | Facility: CLINIC | Age: 48
End: 2023-05-18
Payer: COMMERCIAL

## 2023-05-18 NOTE — PROGRESS NOTES
May 18, 2023    Hello, may I speak with Cyrus Castro?    My name is Nia Donis     I am  with [facility]  [address]  [place] [address] [phone].    Before we get started may I verify your date of birth? 1975    I am calling to officially welcome you to our practice and ask about your recent visit. Is this a good time to talk? yes    Tell me about your visit with us. What things went well?  I thought it went great! Everybody was polite the doctor was great and easy to talk to.       We're always looking for ways to make our patients' experiences even better. Do you have recommendations on ways we may improve?  no    Overall were you satisfied with your first visit to our practice? yes       I appreciate you taking the time to speak with me today. Is there anything else I can do for you? no      Thank you, and have a great day.

## 2023-06-14 ENCOUNTER — TELEPHONE (OUTPATIENT)
Dept: NEUROLOGY | Facility: CLINIC | Age: 48
End: 2023-06-14
Payer: COMMERCIAL

## 2023-06-14 NOTE — TELEPHONE ENCOUNTER
Called pt regarding MRI C-Spine. Pt unable to do on 05/18/23. Asked our referral coordinator to check the dates on PA and call pt to let him know when to schedule. Cancelled appt with Dr Ames on 06/23/23 in order to have MRI report for review. Pt will call us with date of MRI and we will r/s follow up.

## 2023-07-31 ENCOUNTER — TELEPHONE (OUTPATIENT)
Dept: NEUROLOGY | Facility: CLINIC | Age: 48
End: 2023-07-31
Payer: COMMERCIAL